# Patient Record
Sex: MALE | Race: WHITE
[De-identification: names, ages, dates, MRNs, and addresses within clinical notes are randomized per-mention and may not be internally consistent; named-entity substitution may affect disease eponyms.]

---

## 2020-08-01 ENCOUNTER — HOSPITAL ENCOUNTER (EMERGENCY)
Dept: HOSPITAL 74 - JER | Age: 55
Discharge: HOME | End: 2020-08-01
Payer: SELF-PAY

## 2020-08-01 VITALS — HEART RATE: 100 BPM | TEMPERATURE: 98.1 F | DIASTOLIC BLOOD PRESSURE: 87 MMHG | SYSTOLIC BLOOD PRESSURE: 151 MMHG

## 2020-08-01 VITALS — BODY MASS INDEX: 25.8 KG/M2

## 2020-08-01 DIAGNOSIS — K08.89: Primary | ICD-10-CM

## 2020-08-01 PROCEDURE — 3E0233Z INTRODUCTION OF ANTI-INFLAMMATORY INTO MUSCLE, PERCUTANEOUS APPROACH: ICD-10-PCS

## 2020-08-01 NOTE — PDOC
History of Present Illness





- General


History Source: Patient


Exam Limitations: No Limitations





- History of Present Illness


Initial Comments: 





08/01/20 18:46


55-year-old male Yoruba-speaking with history of arthritis, colitis presents 

complaining of right sided upper dental pain and right cheek swelling x2 days.  

States his post follow-up 2 weeks ago.  Denies fever, chills, shortness of 

breath, chest pain, headache, ear pain, dental trauma or any other complaint.  

Attempted to follow-up with his dentist but given COVID pandemic his dental 

office is closed.  Has taken 3 doses of amoxicillin unknown dose that he is had 

at home from [place] since yesterday.





ROS: as above





PE:


GENERAL: well-appearing, NAD


HEAD: NCAT


EYES: Pupils equal, round and reactive to light, sclera anicteric, conjunctiva 

clear


ENT: pharynx: no erythema, no exudate, uvula midline


Dentition: Poor dentition, few caries noted, minimal erythema to right upper 

gum, no fluctuance or pus pocket noted


NECK: supple


CHEST: nontender


RESP: clear, no w/r/r


CARDIO: rrr, no m/g/r


ABD: +BS, soft, nontender, non distended


BACK: no midline spinal ttp, no CVAT 


EXTREMITIES: Normal range of motion, no edema


NEUROLOGICAL: Normal speech, normal gait


SKIN: Warm, Dry





Is this a multiple visit Asthma Patient?: No





<Marizol Garcia - Last Filed: 08/01/20 18:54>





<Shikha Murdock - Last Filed: 08/01/20 19:14>





- General


Chief Complaint: Pain


Stated Complaint: R/SIDE FACE PAIN/SWELLING


Time Seen by Provider: 08/01/20 18:00





Past History





- Medical History


COPD: No


Other medical history: Arthritis





- Psycho-Social/Smoking History


Smoking History: Never smoked





- Substance Abuse Hx (Audit-C & DAST Scrn)


How often the patient has a drink containing alcohol: Never


Score: In Men: 4 or > Positive; In Women: 3 or > Positive: 0


Screen Result (Pos requires Nsg. Audit-10AR): Negative


In the last yr the pt used illegal drug/Rx for NonMed reason: No


Score:  Yes response is considered Positive: 0


Screen Result (Positive result requires Nsg. DAST-10): Negative





<Marizol Garcia - Last Filed: 08/01/20 18:54>





<Shikha Murdock - Last Filed: 08/01/20 19:14>





- Medical History


Allergies/Adverse Reactions: 


                                    Allergies











Allergy/AdvReac Type Severity Reaction Status Date / Time


 


No Known Allergies Allergy   Verified 08/01/20 18:17











Home Medications: 


Ambulatory Orders





Amoxicillin - [Amoxicillin 500mg Capsule -] 500 mg PO BID #14 capsule 08/01/20 


Ibuprofen 600 mg PO Q6H #20 tablet 08/01/20 











*Physical Exam





- Vital Signs


                                Last Vital Signs











Temp Pulse Resp BP Pulse Ox


 


 98.1 F   100 H  18   151/87   98 


 


 08/01/20 18:02  08/01/20 18:02  08/01/20 18:02  08/01/20 18:02  08/01/20 18:02














<Marizol Garcia - Last Filed: 08/01/20 18:54>





- Vital Signs


                                Last Vital Signs











Temp Pulse Resp BP Pulse Ox


 


 98.1 F   100 H  18   151/87   98 


 


 08/01/20 18:02  08/01/20 18:02  08/01/20 18:02  08/01/20 18:02  08/01/20 18:02














<Shikha Murdock - Last Filed: 08/01/20 19:14>





ED Treatment Course





- Medications


Given in the ED: 


ED Medications














Discontinued Medications














Generic Name Dose Route Start Last Admin





  Trade Name Freq  PRN Reason Stop Dose Admin


 


Ketorolac Tromethamine  30 mg  08/01/20 18:42  08/01/20 18:56





  Toradol Injection -  IM  08/01/20 18:43  30 mg





  ONCE ONE   Administration














<Shikha Murdock - Last Filed: 08/01/20 19:14>





Medical Decision Making





- Medical Decision Making





08/01/20 18:50


55-year-old male Yoruba-speaking with history of arthritis, colitis presents 

complaining of right sided upper dental pain and right cheek swelling x2 days.  

States his post follow-up 2 weeks ago.  Denies fever, chills, shortness of 

breath, chest pain, headache, ear pain, dental trauma or any other complaint.  

Attempted to follow-up with his dentist but given COVID pandemic his dental 

office is closed.  Has taken 3 doses of amoxicillin unknown dose that he is had 

at home from [place] since yesterday.





Toradol 30 mg IM x1 dose


Prescription for amoxicillin sent to pharmacy


Advised patient to schedule an appointment with a dentist in the area


Return to ED if you develop fever, chills, worsening facial swelling or 

worsening pain


Alternate between acetaminophen 975 mg and ibuprofen 600 mg every 6 hours as 

needed for pain





<Marizol Garcia - Last Filed: 08/01/20 18:54>





- Medical Decision Making





The patient was seen and evaluated in conjunction with midlevel provider under 

my direct supervision, ancillary studies were reviewed.  I agree with the plan 

as outlined with_JAYCOB garcia. HPI, workup/dispo as outlined. VS reviewed, wnl. 





Vital Signs











Temp Pulse Resp BP Pulse Ox


 


 98.1 F   100 H  18   151/87   98 


 


 08/01/20 18:02  08/01/20 18:02  08/01/20 18:02  08/01/20 18:02  08/01/20 18:02





anticipate discharge, pcp followup, return precautions


amoxicillin, analgesia prn. 





08/01/20 19:14








<Shikha Murdock - Last Filed: 08/01/20 19:14>





Discharge





- Discharge Information


Problems reviewed: Yes





- Admission


No





<Marizol Garcia - Last Filed: 08/01/20 18:54>





- Discharge Information


Problems reviewed: Yes





<Shikha Murdock - Last Filed: 08/01/20 19:14>





- Discharge Information


Clinical Impression/Diagnosis: 


 Pain, dental





Condition: Stable


Disposition: HOME





- Additional Discharge Information


Prescriptions: 


Amoxicillin - [Amoxicillin 500mg Capsule -] 500 mg PO BID #14 capsule


Ibuprofen 600 mg PO Q6H #20 tablet





- Patient Discharge Instructions


Additional Instructions: 


Alternate between acetaminophen 975 mg and ibuprofen 600 mg every 6 hours as 

needed for pain


Take amoxicillin 500 mg 1 tablet twice a day for 7 days


Follow-up with dental this week


If you develop worsening pain, fever, facial swelling or any concerns return to 

ED

## 2020-08-01 NOTE — PDOC
Rapid Medical Evaluation


Time Seen by Provider: 08/01/20 18:00


Medical Evaluation: 





08/01/20 18:01





I performed a brief in-person evaluation of this patient.





Pt is a 54 y/o male who presents to the ED with complaint of dental pain x 3 

days, has taken PCN x 3 days that he got from [place]. He states his post fell 

out.  He has a h/o arthritis, colitis, migraines. 





Pertinent physical exam findings: speaking in full sentences, [place]ijani speaking





I have ordered the following: none








Patient to proceed to ED for further evaluation.

















**Discharge Disposition





- Diagnosis


 Pain, dental








- Referrals





- Patient Instructions





- Post Discharge Activity